# Patient Record
Sex: FEMALE | Race: OTHER | HISPANIC OR LATINO | URBAN - METROPOLITAN AREA
[De-identification: names, ages, dates, MRNs, and addresses within clinical notes are randomized per-mention and may not be internally consistent; named-entity substitution may affect disease eponyms.]

---

## 2018-04-10 ENCOUNTER — EMERGENCY (EMERGENCY)
Facility: HOSPITAL | Age: 43
LOS: 1 days | Discharge: HOME | End: 2018-04-10
Attending: EMERGENCY MEDICINE
Payer: COMMERCIAL

## 2018-04-10 VITALS
SYSTOLIC BLOOD PRESSURE: 152 MMHG | HEART RATE: 109 BPM | DIASTOLIC BLOOD PRESSURE: 84 MMHG | TEMPERATURE: 97 F | RESPIRATION RATE: 18 BRPM | OXYGEN SATURATION: 100 %

## 2018-04-10 VITALS — WEIGHT: 149.91 LBS | HEIGHT: 63 IN

## 2018-04-10 PROCEDURE — 99283 EMERGENCY DEPT VISIT LOW MDM: CPT

## 2018-04-10 PROCEDURE — 99284 EMERGENCY DEPT VISIT MOD MDM: CPT

## 2018-04-10 RX ORDER — ACYCLOVIR SODIUM 500 MG
1 VIAL (EA) INTRAVENOUS
Qty: 35 | Refills: 0 | OUTPATIENT
Start: 2018-04-10 | End: 2018-04-16

## 2018-04-10 RX ORDER — ACYCLOVIR SODIUM 500 MG
800 VIAL (EA) INTRAVENOUS ONCE
Qty: 0 | Refills: 0 | Status: COMPLETED | OUTPATIENT
Start: 2018-04-10 | End: 2018-04-10

## 2018-04-10 RX ADMIN — Medication 1 DROP(S): at 17:40

## 2018-04-10 RX ADMIN — Medication 800 MILLIGRAM(S): at 17:50

## 2018-04-10 RX ADMIN — Medication 60 MILLIGRAM(S): at 17:40

## 2018-04-10 NOTE — ED PROVIDER NOTE - OBJECTIVE STATEMENT
43 y/o F pt w/ no significant PMHx, presents to ED c/o 5 days of R ear pain (internal and external) and R facial weakness and numbness  x2 days. Pt also notes associated painful red rash around her R ear. NKDA.

## 2018-04-10 NOTE — ED PROVIDER NOTE - PHYSICAL EXAMINATION
NEUROLOGICAL: R facial upper and lower nerve paralysis, cranial nerves 2-5, 8-12 grossly intact, normal finger to nose, no pronated drift.

## 2018-04-10 NOTE — ED PROVIDER NOTE - CARE PLAN
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  Take prednisone 60mg (3 tabs) daily for the next 4 days starting tomorrow. Take acyclovir 5 times per day for a week. Use eye drops 6 times per day in the right eye.  Return to the ED if you have any fevers, weakness in the arms or legs or any other concerning symptoms.

## 2018-04-10 NOTE — ED PROVIDER NOTE - MEDICAL DECISION MAKING DETAILS
Pt w/ [place] Heredia syndrome, pt admits to h/o chicken pox in the past. Will d/c with symptomatic treatment.

## 2018-04-10 NOTE — ED PROVIDER NOTE - PLAN OF CARE
Follow up with a neurologist within one week.  Take prednisone 60mg (3 tabs) daily for the next 4 days starting tomorrow. Take acyclovir 5 times per day for a week. Use eye drops 6 times per day in the right eye.  Return to the ED if you have any fevers, weakness in the arms or legs or any other concerning symptoms.

## 2018-04-10 NOTE — ED PROVIDER NOTE - ATTENDING CONTRIBUTION TO CARE
I performed the initial face to face bedside interview with this patient regarding history of present illness, review of symptoms and past medical, social and family history.  I completed an independent physical examination.  I was the initial provider who evaluated this patient.  The history, review of symptoms and examination was documented by the scribe in my presence and I attest to the accuracy of the documentation.  I have signed out the follow up of any pending tests (i.e. labs, radiological studies) to the PA/NP/Resident.  I have discussed the patient’s plan of care and disposition with the PA/NP/Resident